# Patient Record
Sex: FEMALE | Race: WHITE | Employment: FULL TIME | URBAN - METROPOLITAN AREA
[De-identification: names, ages, dates, MRNs, and addresses within clinical notes are randomized per-mention and may not be internally consistent; named-entity substitution may affect disease eponyms.]

---

## 2018-08-17 ENCOUNTER — HOSPITAL ENCOUNTER (OUTPATIENT)
Dept: MAMMOGRAPHY | Age: 58
Discharge: HOME | End: 2018-08-17
Attending: FAMILY MEDICINE
Payer: COMMERCIAL

## 2018-08-17 DIAGNOSIS — Z12.31 VISIT FOR SCREENING MAMMOGRAM: ICD-10-CM

## 2018-08-17 PROCEDURE — 77067 SCR MAMMO BI INCL CAD: CPT

## 2019-07-21 ENCOUNTER — HOSPITAL ENCOUNTER (EMERGENCY)
Age: 59
Discharge: HOME | End: 2019-07-21
Attending: EMERGENCY MEDICINE
Payer: COMMERCIAL

## 2019-07-21 VITALS
HEART RATE: 88 BPM | BODY MASS INDEX: 27.3 KG/M2 | RESPIRATION RATE: 17 BRPM | WEIGHT: 195 LBS | TEMPERATURE: 97.8 F | DIASTOLIC BLOOD PRESSURE: 84 MMHG | HEIGHT: 71 IN | SYSTOLIC BLOOD PRESSURE: 172 MMHG | OXYGEN SATURATION: 96 %

## 2019-07-21 DIAGNOSIS — B02.9 HERPES ZOSTER WITHOUT COMPLICATION: Primary | ICD-10-CM

## 2019-07-21 PROCEDURE — 99282 EMERGENCY DEPT VISIT SF MDM: CPT | Performed by: EMERGENCY MEDICINE

## 2019-07-21 RX ORDER — LEVOTHYROXINE SODIUM 125 UG/1
125 TABLET ORAL
COMMUNITY

## 2019-07-21 RX ORDER — VALACYCLOVIR HYDROCHLORIDE 1 G/1
1000 TABLET, FILM COATED ORAL 3 TIMES DAILY
Qty: 21 TAB | Refills: 0 | Status: SHIPPED | OUTPATIENT
Start: 2019-07-21 | End: 2019-07-28

## 2019-07-21 RX ORDER — PREDNISONE 20 MG/1
40 TABLET ORAL DAILY
Qty: 10 TAB | Refills: 0 | Status: SHIPPED | OUTPATIENT
Start: 2019-07-21 | End: 2019-07-26

## 2019-07-21 RX ORDER — HYDROCODONE BITARTRATE AND ACETAMINOPHEN 5; 325 MG/1; MG/1
1 TABLET ORAL
Qty: 9 TAB | Refills: 0 | Status: SHIPPED | OUTPATIENT
Start: 2019-07-21 | End: 2019-07-24

## 2019-07-22 NOTE — ED PROVIDER NOTES
60-year-old white female presents with discomfort to left shoulder and arm, which began last night. Today she noticed red spots in the same distribution. No fever or vomiting. The rash does not itch, but burns. The history is provided by the patient. Rash           Past Medical History:   Diagnosis Date    Breast cancer [facility])        History reviewed. No pertinent surgical history. History reviewed. No pertinent family history. Social History     Socioeconomic History    Marital status: UNKNOWN     Spouse name: Not on file    Number of children: Not on file    Years of education: Not on file    Highest education level: Not on file   Occupational History    Not on file   Social Needs    Financial resource strain: Not on file    Food insecurity:     Worry: Not on file     Inability: Not on file    Transportation needs:     Medical: Not on file     Non-medical: Not on file   Tobacco Use    Smoking status: Current Every Day Smoker     Packs/day: 0.50   Substance and Sexual Activity    Alcohol use: Not on file    Drug use: Not on file    Sexual activity: Not on file   Lifestyle    Physical activity:     Days per week: Not on file     Minutes per session: Not on file    Stress: Not on file   Relationships    Social connections:     Talks on phone: Not on file     Gets together: Not on file     Attends Gnosticism service: Not on file     Active member of club or organization: Not on file     Attends meetings of clubs or organizations: Not on file     Relationship status: Not on file    Intimate partner violence:     Fear of current or ex partner: Not on file     Emotionally abused: Not on file     Physically abused: Not on file     Forced sexual activity: Not on file   Other Topics Concern    Not on file   Social History Narrative    Not on file         ALLERGIES: Sulfa (sulfonamide antibiotics)    Review of Systems   Constitutional: Negative for fever.    Respiratory: Negative for shortness of breath. Gastrointestinal: Negative for abdominal pain and vomiting. Skin: Positive for rash. Neurological: Negative for headaches. Vitals:    07/21/19 2027   BP: 172/84   Pulse: 88   Resp: 17   Temp: 97.8 °F (36.6 °C)   SpO2: 96%   Weight: 88.5 kg (195 lb)   Height: 5' 11\" (1.803 m)            Physical Exam   Constitutional: She appears well-developed and well-nourished. No distress. HENT:   Head: Normocephalic and atraumatic. Cardiovascular: Normal rate. Pulmonary/Chest: Effort normal.   Musculoskeletal: Normal range of motion. Hypersensitivity to the left posterior shoulder and erythematous vesicular patches extending down the dorsal aspect of the left arm. Neurological: She is alert. Skin: Skin is warm and dry. Psychiatric: She has a normal mood and affect. Her behavior is normal.   Nursing note and vitals reviewed. MDM  Number of Diagnoses or Management Options  Diagnosis management comments: Rash is consistent with shingles. We will treat with Valtrex, hydrocodone and prednisone.     Risk of Complications, Morbidity, and/or Mortality  Presenting problems: low  Diagnostic procedures: low  Management options: low           Procedures

## 2019-07-22 NOTE — ED NOTES
I have reviewed discharge instructions with the patient. The patient verbalized understanding. Patient left ED via Discharge Method: ambulatory to Home with herself. Opportunity for questions and clarification provided. Patient given 3 scripts. To continue your aftercare when you leave the hospital, you may receive an automated call from our care team to check in on how you are doing. This is a free service and part of our promise to provide the best care and service to meet your aftercare needs.  If you have questions, or wish to unsubscribe from this service please call [phone]. Thank you for Choosing our [facility] Emergency Department.

## 2019-07-22 NOTE — ED TRIAGE NOTES
Pt complains of red, bumpy rash on her left forearm and left hand since yesterday. Pt also complains of skin pain on her back. Pt states she is concerned about shingles and denies coming into contact with anything outside or new detergent or soaps.

## 2019-07-22 NOTE — DISCHARGE INSTRUCTIONS
Patient Education        Shingles: Care Instructions  Your Care Instructions    Shingles (herpes zoster) causes pain and a blistered rash. The rash can appear anywhere on the body but will be on only one side of the body, the left or right. It will be in a band, a strip, or a small area. The pain can be very severe. Shingles can also cause tingling or itching in the area of the rash. The blisters scab over after a few days and heal in 2 to 4 weeks. Medicines can help you feel better and may help prevent more serious problems caused by shingles. Shingles is caused by the same virus that causes chickenpox. When you have chickenpox, the virus gets into your nerve roots and stays there (becomes dormant) long after you get over the chickenpox. If the virus becomes active again, it can cause shingles. Follow-up care is a key part of your treatment and safety. Be sure to make and go to all appointments, and call your doctor if you are having problems. It's also a good idea to know your test results and keep a list of the medicines you take. How can you care for yourself at home? · Be safe with medicines. Take your medicines exactly as prescribed. Call your doctor if you think you are having a problem with your medicine. Antiviral medicine helps you get better faster. · Try not to scratch or pick at the blisters. They will crust over and fall off on their own if you leave them alone. · Put cool, wet cloths on the area to relieve pain and itching. You can also use calamine lotion. Try not to use so much lotion that it cakes and is hard to get off. · Put cornstarch or baking soda on the sores to help dry them out so they heal faster. · Do not use thick ointment, such as petroleum jelly, on the sores. This will keep them from drying and healing. · To help remove loose crusts, soak them in tap water. This can help decrease oozing, and dry and soothe the skin.   · Take an over-the-counter pain medicine, such as acetaminophen (Tylenol), ibuprofen (Advil, Motrin), or naproxen (Aleve). Read and follow all instructions on the label. · Avoid close contact with people until the blisters have healed. It is very important for you to avoid contact with anyone who has never had chickenpox or the chickenpox vaccine. Pregnant women, young babies, and anyone else who has a hard time fighting infection (such as someone with HIV, diabetes, or cancer) is especially at risk. When should you call for help? Call your doctor now or seek immediate medical care if:    · You have a new or higher fever.     · You have a severe headache and a stiff neck.     · You lose the ability to think clearly.     · The rash spreads to your forehead, nose, eyes, or eyelids.     · You have eye pain, or your vision gets worse.     · You have new pain in your face, or you cannot move the muscles in your face.     · Blisters spread to new parts of your body.    Watch closely for changes in your health, and be sure to contact your doctor if:    · The rash has not healed after 2 to 4 weeks.     · You still have pain after the rash has healed. Where can you learn more? Go to http://dominick-joshua.info/. Becky Phelps in the search box to learn more about \"Shingles: Care Instructions. \"  Current as of: July 30, 2018  Content Version: 12.1  © 4893-2281 [employer]. Care instructions adapted under license by [employer] (which disclaims liability or warranty for this information). If you have questions about a medical condition or this instruction, always ask your healthcare professional. [address] any warranty or liability for your use of this information.

## 2019-09-04 ENCOUNTER — HOSPITAL ENCOUNTER (OUTPATIENT)
Dept: MAMMOGRAPHY | Age: 59
Discharge: HOME | End: 2019-09-04
Attending: FAMILY MEDICINE

## 2019-09-04 DIAGNOSIS — Z12.31 VISIT FOR SCREENING MAMMOGRAM: ICD-10-CM
